# Patient Record
Sex: FEMALE | URBAN - METROPOLITAN AREA
[De-identification: names, ages, dates, MRNs, and addresses within clinical notes are randomized per-mention and may not be internally consistent; named-entity substitution may affect disease eponyms.]

---

## 2017-11-14 ENCOUNTER — OFFICE VISIT (OUTPATIENT)
Dept: FAMILY MEDICINE CLINIC | Age: 32
End: 2017-11-14

## 2017-11-14 VITALS
SYSTOLIC BLOOD PRESSURE: 117 MMHG | HEART RATE: 77 BPM | WEIGHT: 132.2 LBS | HEIGHT: 65 IN | DIASTOLIC BLOOD PRESSURE: 86 MMHG | RESPIRATION RATE: 17 BRPM | BODY MASS INDEX: 22.02 KG/M2 | OXYGEN SATURATION: 99 % | TEMPERATURE: 98 F

## 2017-11-14 DIAGNOSIS — Z02.89 HISTORY AND PHYSICAL EXAMINATION, IMMIGRATION: Primary | ICD-10-CM

## 2017-11-15 NOTE — PROGRESS NOTES
Davis Rosales is a 28 y.o.  female and presents with initial immigration physical. She denies any Class A or B conditions. Chief Complaint   Patient presents with    Physical     Subjective: Additional Concerns: none     Allergies not on file  No past medical history on file. No past surgical history on file. No family history on file. Social History   Substance Use Topics    Smoking status: Not on file    Smokeless tobacco: Not on file    Alcohol use Not on file     ROS     General: negative for - chills, fatigue, fever, weight change  Psych: negative for - anxiety, depression, irritability or mood swings  Resp: negative for - cough, shortness of breath or wheezing  CV: negative for - chest pain, edema or palpitations      Objective:  Vitals:    11/14/17 1338   BP: 117/86   Pulse: 77   Resp: 17   Temp: 98 °F (36.7 °C)   TempSrc: Oral   SpO2: 99%   Weight: 132 lb 3.2 oz (60 kg)   Height: 5' 4.96\" (1.65 m)   PainSc:   0 - No pain     PE    Alert, well appearing, and in no distress, oriented to person, place, and time and normal appearing weight  Mental status - alert, oriented to person, place, and time, normal mood, behavior, speech, dress, motor activity, and thought processes  Chest - clear to auscultation, no wheezes, rales or rhonchi, symmetric air entry  Heart - normal rate, regular rhythm, normal S1, S2, no murmurs, rubs, clicks or gallops  Extremities - peripheral pulses normal, no pedal edema, no clubbing or cyanosis    LABS   No results found for any previous visit. TESTS  No results found for this or any previous visit. Assessment/Plan:      History and physical examination, immigration - No Class A or B conditions. Patient to obtain T[facility]ap and flu [facility] And bring back documentation. Patient has documentation for RPR and gono within a year. - QUANTIFERON TB GOLD(CLIENT INCUB.);  Future    Lab review: orders written for new lab studies as appropriate; see orders. We will call for results and further plan. I have discussed the diagnosis with the patient and the intended plan as seen in the above orders. The patient has received an after-visit summary and questions were answered concerning future plans. I have discussed medication side effects and warnings with the patient as well. I have reviewed the plan of care with the patient, accepted their input and they are in agreement with the treatment goals. F/U as needed.      Marylene Matte, MD

## 2017-11-15 NOTE — PATIENT INSTRUCTIONS

## 2017-12-01 ENCOUNTER — CLINICAL SUPPORT (OUTPATIENT)
Dept: FAMILY MEDICINE CLINIC | Age: 32
End: 2017-12-01

## 2017-12-01 VITALS — TEMPERATURE: 98.1 F

## 2017-12-01 DIAGNOSIS — Z23 ENCOUNTER FOR IMMUNIZATION: Primary | ICD-10-CM
